# Patient Record
Sex: MALE | Race: BLACK OR AFRICAN AMERICAN | Employment: FULL TIME | URBAN - METROPOLITAN AREA
[De-identification: names, ages, dates, MRNs, and addresses within clinical notes are randomized per-mention and may not be internally consistent; named-entity substitution may affect disease eponyms.]

---

## 2019-03-24 ENCOUNTER — APPOINTMENT (OUTPATIENT)
Dept: VASCULAR SURGERY | Age: 34
End: 2019-03-24
Attending: EMERGENCY MEDICINE
Payer: COMMERCIAL

## 2019-03-24 ENCOUNTER — HOSPITAL ENCOUNTER (EMERGENCY)
Age: 34
Discharge: HOME | End: 2019-03-24
Attending: EMERGENCY MEDICINE
Payer: COMMERCIAL

## 2019-03-24 VITALS
TEMPERATURE: 97.9 F | RESPIRATION RATE: 16 BRPM | HEART RATE: 86 BPM | HEIGHT: 72 IN | SYSTOLIC BLOOD PRESSURE: 131 MMHG | BODY MASS INDEX: 42.66 KG/M2 | OXYGEN SATURATION: 100 % | WEIGHT: 315 LBS | DIASTOLIC BLOOD PRESSURE: 79 MMHG

## 2019-03-24 DIAGNOSIS — L03.116 CELLULITIS OF LEFT LOWER EXTREMITY: Primary | ICD-10-CM

## 2019-03-24 PROCEDURE — 99282 EMERGENCY DEPT VISIT SF MDM: CPT

## 2019-03-24 PROCEDURE — 93971 EXTREMITY STUDY: CPT

## 2019-03-24 RX ORDER — CEPHALEXIN 500 MG/1
500 CAPSULE ORAL 3 TIMES DAILY
Qty: 30 CAP | Refills: 0 | Status: SHIPPED | OUTPATIENT
Start: 2019-03-24 | End: 2019-04-03

## 2019-03-24 NOTE — ED NOTES
Pt refused boostrix injection. RN explained risk and benefits including death from tetany. Pt verbalizes understanding. MD notified

## 2019-03-24 NOTE — ED TRIAGE NOTES
Triage:  Pt arrives from home with a CC of having left shin pain that started when he hit his leg on a toe hitch. Pt patient arrives with a bruise to left leg with surrounding erythema. The pain is aggravated by ambulation.

## 2019-03-24 NOTE — ED PROVIDER NOTES
26 y.o. male with no significant past medical history who presents from home via personal vehicle with chief complaint of leg pain. Pt states that 3 weeks ago, he hit his left leg against a trailer hitch. Pt notes that about 2 weeks ago his leg became red around the abrasion with some notable swelling. Pt states the swelling won't go away and it is becoming painful. Pt states he believes his last tetanus shot was in 2006. Pt states he is in good health. Pt denies being thirsty or frequent urination. There are no other acute medical concerns at this time. Social hx: none. Note written by chuy Li, as dictated by Isabella Gomes MD 7:31 AM 
 
 
The history is provided by the patient. No  was used. No past medical history on file. No past surgical history on file. No family history on file. Social History Socioeconomic History  Marital status: Not on file Spouse name: Not on file  Number of children: Not on file  Years of education: Not on file  Highest education level: Not on file Occupational History  Not on file Social Needs  Financial resource strain: Not on file  Food insecurity:  
  Worry: Not on file Inability: Not on file  Transportation needs:  
  Medical: Not on file Non-medical: Not on file Tobacco Use  Smoking status: Not on file Substance and Sexual Activity  Alcohol use: Not on file  Drug use: Not on file  Sexual activity: Not on file Lifestyle  Physical activity:  
  Days per week: Not on file Minutes per session: Not on file  Stress: Not on file Relationships  Social connections:  
  Talks on phone: Not on file Gets together: Not on file Attends Taoist service: Not on file Active member of club or organization: Not on file Attends meetings of clubs or organizations: Not on file Relationship status: Not on file  Intimate partner violence: Fear of current or ex partner: Not on file Emotionally abused: Not on file Physically abused: Not on file Forced sexual activity: Not on file Other Topics Concern  Not on file Social History Narrative  Not on file ALLERGIES: Patient has no known allergies. Review of Systems Constitutional: Negative for appetite change, chills and fever. HENT: Negative for rhinorrhea, sore throat and trouble swallowing. Excessive Thirst.  
Eyes: Negative for photophobia. Respiratory: Negative for cough and shortness of breath. Cardiovascular: Positive for leg swelling (left leg). Negative for chest pain and palpitations. Gastrointestinal: Negative for abdominal pain, nausea and vomiting. Genitourinary: Negative for dysuria, frequency and hematuria. Musculoskeletal: Negative for arthralgias. Skin: Positive for wound (mid anterior left leg). Neurological: Negative for dizziness, syncope and weakness. Psychiatric/Behavioral: Negative for behavioral problems. The patient is not nervous/anxious. Vitals:  
 03/24/19 2791 BP: 131/79 Pulse: 86 Resp: 16 Temp: 97.9 °F (36.6 °C) SpO2: 100% Physical Exam  
Constitutional: He appears well-developed and well-nourished. HENT:  
Head: Normocephalic and atraumatic. Mouth/Throat: Oropharynx is clear and moist.  
Eyes: Pupils are equal, round, and reactive to light. EOM are normal.  
Neck: Normal range of motion. Neck supple. Cardiovascular: Normal rate, regular rhythm, normal heart sounds and intact distal pulses. Exam reveals no gallop and no friction rub. No murmur heard. Pulmonary/Chest: Effort normal. No respiratory distress. He has no wheezes. He has no rales. Abdominal: Soft. There is no tenderness. There is no rebound. Musculoskeletal: Normal range of motion. Left leg larger than right leg. Neurological: He is alert. No cranial nerve deficit. Motor; symmetric Skin:  
 Abrasion mid anterior of left leg. Surrounding erythema and redness. Psychiatric: He has a normal mood and affect. His behavior is normal.  
Nursing note and vitals reviewed. Note written by chuy Jonas, as dictated by Jefferson Garibay MD 7:31 AM 
 
MDM Procedures PROGRESS NOTE: 
7:50 AM 
Pt refused his Tetanus shot. PROGRESS NOTE: 
8:19 AM 
Pt refused ultrasound and tetanus. Will speak with the patient and reiterate the importance. PROGRESS NOTE: 
8:21 AM 
Spoke with the patient about tetanus stating that it can cause death or severe illness with prolonged intubation and ventillation. Discussed DVT's not getting better without proper treatment and leading to potentially fatal PE. Pt still denies the tetanus shot but will get his vascular doppler.

## 2019-03-24 NOTE — DISCHARGE INSTRUCTIONS
Patient [address] [facility] Departments     For adult and child immunizations, family planning, TB screening, STD testing and women's health services. [facility]: [place] [phone]      [facility] 25   [address]   [address]   [address] [facility] [address]: Pauletta Peabody [address] [phone]      [address]          [address] [place] [address]     For primary care services, woman and child wellness, and some clinics providing specialty care. [facility] -- [address] 2525 [facility] [phone]   [address] [facility] 200 [facility] 3614 [facility] [phone]   [address] [facility] [address]  [phone]   [address] [facility] [address] [place] [address] 5850  Community  [phone]   [address] [place] [address] [address] [phone]   [facility] 81 [facility] [phone]   [facility] [address] [place] [address] [phone]   [facility]: [facility] [address] [place] [address], [address] [phone]     [facility] [address] [facility] [address] [phone]   [facility] 5850 [facility]  [phone]   [facility]  [address], [address] ([email or web address]) [phone]         Sexual Health/Woman Wellness Clinics    For STD/HIV testing and treatment, pregnancy testing and services, men's health, birth control services, LGBT services, and hepatitis/HPV vaccine services. [facility]jay [facility] Kathie for [place] [employer] 201 [address] [facility] 75 [facility] [address] [facility] 1578 600 ADINA Wolfe [phone]   [facility] [address], 5th floor [phone]   Pregnancy [address]201 [facility] for Women [facility] FRANK  Chitra Arriaga [phone] [address] [facility] [address] Pritesh Bedoya [facility] [phone]   6631 [facility]   [phone]   [place]   [phone]   Women, Infant and Children's Services: [address] [phone]       600 [facility]   [phone]   [address]   [facility]     [phone]   [address] Crisis   [address] [facility] [address] [phone]     Local Primary Care Physicians  [facility] [phone]  MD Lorin Hwang MD May Clam, MD [facility] [phone]  Maame Steele, FNP  MD Shikha Talavera MD Ardyce Hunting, MD [address] [place] [address] [phone]  MD Joshua Fatima [place] [address] [facility] [phone]  MD Nusrat Quinonez, MD Janyce Starch, MD Earmon Ahumada, MD   [facility] [phone]  [facility] JAMES ROMERO, MD Freedom Salinas MD  [facility], NP [address] [facility] [address] [facility] [address] [phone]  Noralee Breed, MD Emmit Eva, MD Kennard Pickett, MD Dyana Heimlich, MD Garry Coombs, MD Arnav Green, MD   33 57 [facility]  Eda Flores MD [address] [phone]  MD Jessenia Aguilar, NP  MD Darby Lazcano, MD Myrna Fine, MD Daniel Mckeon MD   3961 [facility] [phone]  MD Nicolasa Solitario, FNP  Darry Goodell, NP  MD Isabel Moraes, MD Sagar Dang, MD REINIER Brown [facility] [phone]  Rojean Kid, MD  Luis MD Adi Shrestha MD Ricardo Ree, MD Bernardo Grew, MD   [address] [phone]  MD Hortencia Hanks MD [facility] [phone]  MD [facility] MD Chi Remy MD   1900 [facility] [phone]  Stephanie Lennon, MD Collin Becker MD Manley Kiang, MD Malen Foster, MD Majorie Miner, ABISAI Garcia MD 1619 [facility]   [phone]  MD Bryson Walden MD Ballard Siren, MD   2102 [facility] [phone]  MD Danitza De Leon, BELLA Oconnor, DAWN Oconnor, DAWN Stovall MD Lesli Postal, ABISAI Last, DO Miscellaneous:  Frannie Lazcano [phone]            Cellulitis: Care Instructions  Your Care Instructions    Cellulitis is a skin infection caused by bacteria, most often strep or staph. It often occurs after a break in the skin from a scrape, cut, bite, or puncture, or after a rash. Cellulitis may be treated without doing tests to find out what caused it. But your doctor may do tests, if needed, to look for a specific bacteria, like methicillin-resistant Staphylococcus aureus (MRSA). The doctor has checked you carefully, but problems can develop later. If you notice any problems or new symptoms, get medical treatment right away. Follow-up care is a key part of your treatment and safety. Be sure to make and go to all appointments, and call your doctor if you are having problems. It's also a good idea to know your test results and keep a list of the medicines you take. How can you care for yourself at home? · Take your antibiotics as directed. Do not stop taking them just because you feel better. You need to take the full course of antibiotics. · Prop up the infected area on pillows to reduce pain and swelling. Try to keep the area above the level of your heart as often as you can. · If your doctor told you how to care for your wound, follow your doctor's instructions.  If you did not get instructions, follow this general advice:  ? Wash the wound with clean water 2 times a day. Don't use hydrogen peroxide or alcohol, which can slow healing. ? You may cover the wound with a thin layer of petroleum jelly, such as Vaseline, and a nonstick bandage. ? Apply more petroleum jelly and replace the bandage as needed. · Be safe with medicines. Take pain medicines exactly as directed. ? If the doctor gave you a prescription medicine for pain, take it as prescribed. ? If you are not taking a prescription pain medicine, ask your doctor if you can take an over-the-counter medicine. To prevent cellulitis in the future  · Try to prevent cuts, scrapes, or other injuries to your skin. Cellulitis most often occurs where there is a break in the skin. · If you get a scrape, cut, mild burn, or bite, wash the wound with clean water as soon as you can to help avoid infection. Don't use hydrogen peroxide or alcohol, which can slow healing. · If you have swelling in your legs (edema), support stockings and good skin care may help prevent leg sores and cellulitis. · Take care of your feet, especially if you have diabetes or other conditions that increase the risk of infection. Wear shoes and socks. Do not go barefoot. If you have athlete's foot or other skin problems on your feet, talk to your doctor about how to treat them. When should you call for help? Call your doctor now or seek immediate medical care if:    · You have signs that your infection is getting worse, such as:  ? Increased pain, swelling, warmth, or redness. ? Red streaks leading from the area. ? Pus draining from the area. ? A fever.     · You get a rash.    Watch closely for changes in your health, and be sure to contact your doctor if:    · You do not get better as expected. Where can you learn more? Go to http://neela-margaux.info/. Crispin Mcarthur in the search box to learn more about \"Cellulitis: Care Instructions. \"  Current as of: April 17, 2018  Content Version: 11.9  © 0970-8960 [employer]. Care instructions adapted under license by [employer] (which disclaims liability or warranty for this information). If you have questions about a medical condition or this instruction, always ask your healthcare professional. [address] any warranty or liability for your use of this information.

## 2019-04-16 ENCOUNTER — OFFICE VISIT (OUTPATIENT)
Dept: FAMILY MEDICINE CLINIC | Age: 34
End: 2019-04-16

## 2019-04-16 VITALS
SYSTOLIC BLOOD PRESSURE: 112 MMHG | WEIGHT: 315 LBS | BODY MASS INDEX: 42.66 KG/M2 | HEART RATE: 90 BPM | HEIGHT: 72 IN | DIASTOLIC BLOOD PRESSURE: 72 MMHG | RESPIRATION RATE: 20 BRPM | OXYGEN SATURATION: 97 % | TEMPERATURE: 97.6 F

## 2019-04-16 DIAGNOSIS — E55.9 VITAMIN D DEFICIENCY: ICD-10-CM

## 2019-04-16 DIAGNOSIS — Z13.220 LIPID SCREENING: ICD-10-CM

## 2019-04-16 DIAGNOSIS — L03.116 LEFT LEG CELLULITIS: ICD-10-CM

## 2019-04-16 DIAGNOSIS — R53.83 FATIGUE, UNSPECIFIED TYPE: ICD-10-CM

## 2019-04-16 DIAGNOSIS — E66.01 OBESITY, MORBID (HCC): ICD-10-CM

## 2019-04-16 DIAGNOSIS — Z76.89 ENCOUNTER TO ESTABLISH CARE: Primary | ICD-10-CM

## 2019-04-16 DIAGNOSIS — M79.89 LEFT LEG SWELLING: ICD-10-CM

## 2019-04-16 DIAGNOSIS — Z13.1 DIABETES MELLITUS SCREENING: ICD-10-CM

## 2019-04-16 RX ORDER — HYDROCHLOROTHIAZIDE 12.5 MG/1
12.5 TABLET ORAL
Qty: 60 TAB | Refills: 1 | Status: SHIPPED | OUTPATIENT
Start: 2019-04-16 | End: 2019-04-24 | Stop reason: SDUPTHER

## 2019-04-16 RX ORDER — SULFAMETHOXAZOLE AND TRIMETHOPRIM 800; 160 MG/1; MG/1
1 TABLET ORAL 2 TIMES DAILY
Qty: 20 TAB | Refills: 0 | Status: SHIPPED | OUTPATIENT
Start: 2019-04-16 | End: 2019-04-24 | Stop reason: SDUPTHER

## 2019-04-16 NOTE — PROGRESS NOTES
Bev Davenport  Identified pt with two pt identifiers(name and ). Chief Complaint Patient presents with  Leg Swelling Rm 7  
 
 
1. Have you been to the ER, urgent care clinic since your last visit? [address]  Hospitalized since your last visit? N 
 
2. Have you seen or consulted any other health care providers outside of the [address] [place] [address] since your last visit? Include any pap smears or colon screening. Tahir Anthony Advance Care Planning In the event something were to happen to you and you were unable to speak on your behalf, do you have an Advance Directive/ Living Will in place stating your wishes? NO If yes, do we have a copy on file NO If no, would you like information NO Medication reconciliation up to date and corrected with patient at this time. Today's provider has been notified of reason for visit, vitals and flowsheets obtained on patients. Reviewed record in preparation for visit, huddled with provider and have obtained necessary documentation. Health Maintenance Due Topic  DTaP/Tdap/Td series (1 - Tdap) Wt Readings from Last 3 Encounters:  
19 319 lb 14.4 oz (145.1 kg) Temp Readings from Last 3 Encounters:  
19 97.6 °F (36.4 °C) (Oral) BP Readings from Last 3 Encounters:  
19 112/72 Pulse Readings from Last 3 Encounters:  
19 90 Vitals:  
 19 1015 BP: 112/72 Pulse: 90 Resp: 20 Temp: 97.6 °F (36.4 °C) TempSrc: Oral  
SpO2: 97% Weight: 319 lb 14.4 oz (145.1 kg) Height: 6' (1.829 m) PainSc:   0 - No pain Learning Assessment: 
:  
 
Learning Assessment 2019 PRIMARY LEARNER Patient HIGHEST LEVEL OF EDUCATION - PRIMARY LEARNER  SOME COLLEGE  
BARRIERS PRIMARY LEARNER NONE  
CO-LEARNER CAREGIVER No  
PRIMARY LANGUAGE ENGLISH  
LEARNER PREFERENCE PRIMARY DEMONSTRATION  
ANSWERED BY patient RELATIONSHIP SELF Depression Screening: 
:  
 
 3 most recent PHQ Screens 4/16/2019 Little interest or pleasure in doing things Not at all Feeling down, depressed, irritable, or hopeless Not at all Total Score PHQ 2 0 No flowsheet data found. Fall Risk Assessment: 
:  
 
No flowsheet data found. Abuse Screening: 
:  
 
No flowsheet data found. ADL Screening: 
:  
 
ADL Assessment 4/16/2019 Feeding yourself No Help Needed Getting from bed to chair No Help Needed Getting dressed No Help Needed Bathing or showering No Help Needed Walk across the room (includes cane/walker) No Help Needed Using the telphone No Help Needed Taking your medications No Help Needed Preparing meals No Help Needed Managing money (expenses/bills) No Help Needed Moderately strenuous housework (laundry) No Help Needed Shopping for personal items (toiletries/medicines) No Help Needed Shopping for groceries No Help Needed Driving No Help Needed Climbing a flight of stairs No Help Needed Getting to places beyond walking distances No Help Needed BMI: 
Weight Metrics 4/16/2019 Weight 319 lb 14.4 oz BMI 43.39 kg/m2 Medication reconciliation up to date and corrected with patient at this time.

## 2019-04-16 NOTE — PATIENT INSTRUCTIONS
Cellulitis: Care Instructions Your Care Instructions Cellulitis is a skin infection caused by bacteria, most often strep or staph. It often occurs after a break in the skin from a scrape, cut, bite, or puncture, or after a rash. Cellulitis may be treated without doing tests to find out what caused it. But your doctor may do tests, if needed, to look for a specific bacteria, like methicillin-resistant Staphylococcus aureus (MRSA). The doctor has checked you carefully, but problems can develop later. If you notice any problems or new symptoms, get medical treatment right away. Follow-up care is a key part of your treatment and safety. Be sure to make and go to all appointments, and call your doctor if you are having problems. It's also a good idea to know your test results and keep a list of the medicines you take. How can you care for yourself at home? · Take your antibiotics as directed. Do not stop taking them just because you feel better. You need to take the full course of antibiotics. · Prop up the infected area on pillows to reduce pain and swelling. Try to keep the area above the level of your heart as often as you can. · If your doctor told you how to care for your wound, follow your doctor's instructions. If you did not get instructions, follow this general advice: 
? Wash the wound with clean water 2 times a day. Don't use hydrogen peroxide or alcohol, which can slow healing. ? You may cover the wound with a thin layer of petroleum jelly, such as Vaseline, and a nonstick bandage. ? Apply more petroleum jelly and replace the bandage as needed. · Be safe with medicines. Take pain medicines exactly as directed. ? If the doctor gave you a prescription medicine for pain, take it as prescribed. ? If you are not taking a prescription pain medicine, ask your doctor if you can take an over-the-counter medicine. To prevent cellulitis in the future · Try to prevent cuts, scrapes, or other injuries to your skin. Cellulitis most often occurs where there is a break in the skin. · If you get a scrape, cut, mild burn, or bite, wash the wound with clean water as soon as you can to help avoid infection. Don't use hydrogen peroxide or alcohol, which can slow healing. · If you have swelling in your legs (edema), support stockings and good skin care may help prevent leg sores and cellulitis. · Take care of your feet, especially if you have diabetes or other conditions that increase the risk of infection. Wear shoes and socks. Do not go barefoot. If you have athlete's foot or other skin problems on your feet, talk to your doctor about how to treat them. When should you call for help? Call your doctor now or seek immediate medical care if: 
  · You have signs that your infection is getting worse, such as: 
? Increased pain, swelling, warmth, or redness. ? Red streaks leading from the area. ? Pus draining from the area. ? A fever.  
  · You get a rash.  
 Watch closely for changes in your health, and be sure to contact your doctor if: 
  · You do not get better as expected. Where can you learn more? Go to http://neela-margaux.info/. Faye Rai in the search box to learn more about \"Cellulitis: Care Instructions. \" Current as of: April 17, 2018 Content Version: 11.9 © 1892-4486 [employer]. Care instructions adapted under license by [employer] (which disclaims liability or warranty for this information). If you have questions about a medical condition or this instruction, always ask your healthcare professional. [address] any warranty or liability for your use of this information. Leg and Ankle Edema: Care Instructions Your Care Instructions Swelling in the legs, ankles, and feet is called edema.  It is common after you sit or stand for a while. Long plane flights or car rides often cause swelling in the legs and feet. You may also have swelling if you have to stand for long periods of time at your job. Problems with the veins in the legs (varicose veins) and changes in hormones can also cause swelling. Sometimes the swelling in the ankles and feet is caused by a more serious problem, such as heart failure, infection, blood clots, or liver or kidney disease. Follow-up care is a key part of your treatment and safety. Be sure to make and go to all appointments, and call your doctor if you are having problems. It's also a good idea to know your test results and keep a list of the medicines you take. How can you care for yourself at home? · If your doctor gave you medicine, take it as prescribed. Call your doctor if you think you are having a problem with your medicine. · Whenever you are resting, raise your legs up. Try to keep the swollen area higher than the level of your heart. · Take breaks from standing or sitting in one position. ? Walk around to increase the blood flow in your lower legs. ? Move your feet and ankles often while you stand, or tighten and relax your leg muscles. · Wear support stockings. Put them on in the morning, before swelling gets worse. · Eat a balanced diet. Lose weight if you need to. · Limit the amount of salt (sodium) in your diet. Salt holds fluid in the body and may increase swelling. When should you call for help? Call 911 anytime you think you may need emergency care. For example, call if: 
  · You have symptoms of a blood clot in your lung (called a pulmonary embolism). These may include: 
? Sudden chest pain. ? Trouble breathing. ? Coughing up blood.  
 Call your doctor now or seek immediate medical care if: 
  · You have signs of a blood clot, such as: 
? Pain in your calf, back of the knee, thigh, or groin. ? Redness and swelling in your leg or groin.   · You have symptoms of infection, such as: 
? Increased pain, swelling, warmth, or redness. ? Red streaks or pus. ? A fever.  
 Watch closely for changes in your health, and be sure to contact your doctor if: 
  · Your swelling is getting worse.  
  · You have new or worsening pain in your legs.  
  · You do not get better as expected. Where can you learn more? Go to http://neela-margaux.info/. Enter S628 in the search box to learn more about \"Leg and Ankle Edema: Care Instructions. \" Current as of: September 23, 2018 Content Version: 11.9 © 4571-5619 [employer]. Care instructions adapted under license by [employer] (which disclaims liability or warranty for this information). If you have questions about a medical condition or this instruction, always ask your healthcare professional. [address] any warranty or liability for your use of this information.

## 2019-04-16 NOTE — PROGRESS NOTES
Chief Complaint Patient presents with  Leg Swelling Rm 7  
 
 
HPI: 
Brayden Ding is a 26y.o. year old male who is a new patient and is here to establish care. He was previously followed by unknown, last doc visit was at 11years old. He is moving to [place], [place] in 2 weeks. His wife is a  and they are moving for her new job. Leg swelling x 1 1/2 months ago. He hit a tow hitch recently on 3/3/2019. He went to the ED. He was prescribed antibiotics for cellulitis. He had a venous doppler of the left leg that was negative per the patient. He refused Tdap. He worked last night 8 hours and was able to sit. Today his leg is at what it is at it's best therefore. He is a  and also a night at [employer]. He reports that at it it's worse it is approximately twice the size it is today. Left leg The following sections were reviewed & updated as appropriate: PMH, PSH, PL, FMH,  and SH. History reviewed. No pertinent past medical history. History reviewed. No pertinent surgical history. Patient Active Problem List  
Diagnosis Code  Obesity, morbid ([facility] [address] E66.01  Left leg swelling M79.89 Family History Problem Relation Age of Onset  Cancer Mother  Dementia Father  Alcohol abuse Brother  Cancer Maternal Aunt  Cancer Paternal Uncle  Alzheimer Paternal Grandmother Social History Socioeconomic History  Marital status:  Spouse name: Not on file  Number of children: Not on file  Years of education: Not on file  Highest education level: Not on file Occupational History  Not on file Social Needs  Financial resource strain: Not on file  Food insecurity:  
  Worry: Not on file Inability: Not on file  Transportation needs:  
  Medical: Not on file Non-medical: Not on file Tobacco Use  Smoking status: Never Smoker  Smokeless tobacco: Never Used Substance and Sexual Activity  Alcohol use: Never Frequency: Never  Drug use: Never  Sexual activity: Yes  
  Partners: Female Lifestyle  Physical activity:  
  Days per week: Not on file Minutes per session: Not on file  Stress: Not on file Relationships  Social connections:  
  Talks on phone: Not on file Gets together: Not on file Attends Church service: Not on file Active member of club or organization: Not on file Attends meetings of clubs or organizations: Not on file Relationship status: Not on file  Intimate partner violence:  
  Fear of current or ex partner: Not on file Emotionally abused: Not on file Physically abused: Not on file Forced sexual activity: Not on file Other Topics Concern  Not on file Social History Narrative  Not on file Prior to Admission medications Medication Sig Start Date End Date Taking? Authorizing Provider  
hydroCHLOROthiazide (HYDRODIURIL) 12.5 mg tablet Take 1 Tab by mouth two (2) times daily as needed for Other (edema). 4/16/19  Yes Vicki Denny NP  
trimethoprim-sulfamethoxazole (BACTRIM DS, SEPTRA DS) 160-800 mg per tablet Take 1 Tab by mouth two (2) times a day for 10 days. 4/16/19 4/26/19 Yes Vicki Denny NP No Known Allergies Review of Systems A comprehensive review of systems was negative except for that written in the HPI. Objective: 
 
  
Visit Vitals /72 (BP 1 Location: Right arm, BP Patient Position: Sitting) Pulse 90 Temp 97.6 °F (36.4 °C) (Oral) Resp 20 Ht 6' (1.829 m) Wt 319 lb 14.4 oz (145.1 kg) SpO2 97% BMI 43.39 kg/m² Physical Exam 
Gen: alert, oriented, no acute distress Resp: no increase work of breathing, lungs clear to ausculation bilaterally, no wheezing, rales or rhonchi CV: S1, S2 normal.  No murmurs, rubs, or gallops. Skin: as above Extremities: as above Assessment & Plan: ICD-10-CM ICD-9-CM 1. Encounter to establish care Z76.89 V65.8 URINALYSIS W/ RFLX MICROSCOPIC  
   LIPID PANEL  
   METABOLIC PANEL, COMPREHENSIVE  
   TSH 3RD GENERATION  
   VITAMIN D, 25 HYDROXY  
   HEMOGLOBIN A1C WITH EAG  
   CBC WITH AUTOMATED DIFF 2. Left leg swelling M79.89 729.81 hydroCHLOROthiazide (HYDRODIURIL) 12.5 mg tablet 3. Left leg cellulitis L03.116 682.6 trimethoprim-sulfamethoxazole (BACTRIM DS, SEPTRA DS) 160-800 mg per tablet 4. Obesity, morbid ([address] E66.01 278.01 URINALYSIS W/ RFLX MICROSCOPIC  
   LIPID PANEL  
   METABOLIC PANEL, COMPREHENSIVE  
   TSH 3RD GENERATION  
   VITAMIN D, 25 HYDROXY  
   HEMOGLOBIN A1C WITH EAG  
   CBC WITH AUTOMATED DIFF 5. Diabetes mellitus screening Z13.1 V77.1 URINALYSIS W/ RFLX MICROSCOPIC METABOLIC PANEL, COMPREHENSIVE  
   HEMOGLOBIN A1C WITH EAG 6. Lipid screening Z13.220 V77.91 LIPID PANEL 7. Vitamin D deficiency E55.9 268.9 VITAMIN D, 25 HYDROXY 8. Fatigue, unspecified type R53.83 780.79 URINALYSIS W/ RFLX MICROSCOPIC  
   LIPID PANEL  
   METABOLIC PANEL, COMPREHENSIVE  
   TSH 3RD GENERATION  
   VITAMIN D, 25 HYDROXY  
   HEMOGLOBIN A1C WITH EAG  
   CBC WITH AUTOMATED DIFF Follow-up and Dispositions · Return in about 1 week (around 4/23/2019) for Medication Check, Lab F/U, MICHAELLE recheck. lab results and schedule of future lab studies reviewed with patient - fasting labs ordered, he will have labs done tomorrow morning 
reviewed diet, exercise and weight control 
reviewed medications and side effects in detail Differential diagnosis and treatment options reviewed with patient who is in agreement with treatment plan as outlined below. Health Maintenance reviewed - deferred to next visit. Recommended healthy diet low in carbohydrates, fats, sodium and cholesterol. Recommended regular cardiovascular exercise 3-6 times per week for 30-60 minutes daily. Chart is reviewed and updated today in the office.   Records requested for other providers patient has seen and is currently seeing.  (Prescription Monitoring Program) report was run and reviewed today in the office. Patient was offered a choice/choices in the treatment plan today. Patient expresses understanding of the plan and agrees with recommendations. Verbal and written instructions (see AVS) provided. See patient instructions for more. Patient expresses understanding of diagnosis and treatment plan.

## 2019-04-17 DIAGNOSIS — Z76.89 ENCOUNTER TO ESTABLISH CARE: ICD-10-CM

## 2019-04-17 DIAGNOSIS — Z13.1 DIABETES MELLITUS SCREENING: ICD-10-CM

## 2019-04-17 DIAGNOSIS — E66.01 OBESITY, MORBID (HCC): ICD-10-CM

## 2019-04-17 DIAGNOSIS — R53.83 FATIGUE, UNSPECIFIED TYPE: ICD-10-CM

## 2019-04-17 DIAGNOSIS — E55.9 VITAMIN D DEFICIENCY: ICD-10-CM

## 2019-04-17 DIAGNOSIS — Z13.220 LIPID SCREENING: ICD-10-CM

## 2019-04-18 LAB
25(OH)D3+25(OH)D2 SERPL-MCNC: 12.8 NG/ML (ref 30–100)
ALBUMIN SERPL-MCNC: 4.3 G/DL (ref 3.5–5.5)
ALBUMIN/GLOB SERPL: 1.3 {RATIO} (ref 1.2–2.2)
ALP SERPL-CCNC: 95 IU/L (ref 39–117)
ALT SERPL-CCNC: 13 IU/L (ref 0–44)
APPEARANCE UR: CLEAR
AST SERPL-CCNC: 13 IU/L (ref 0–40)
BACTERIA #/AREA URNS HPF: NORMAL /[HPF]
BASOPHILS # BLD AUTO: 0 X10E3/UL (ref 0–0.2)
BASOPHILS NFR BLD AUTO: 0 %
BILIRUB SERPL-MCNC: 0.8 MG/DL (ref 0–1.2)
BILIRUB UR QL STRIP: NEGATIVE
BUN SERPL-MCNC: 11 MG/DL (ref 6–20)
BUN/CREAT SERPL: 9 (ref 9–20)
CALCIUM SERPL-MCNC: 9.4 MG/DL (ref 8.7–10.2)
CASTS URNS QL MICRO: NORMAL /LPF
CHLORIDE SERPL-SCNC: 99 MMOL/L (ref 96–106)
CHOLEST SERPL-MCNC: 158 MG/DL (ref 100–199)
CO2 SERPL-SCNC: 26 MMOL/L (ref 20–29)
COLOR UR: YELLOW
CREAT SERPL-MCNC: 1.28 MG/DL (ref 0.76–1.27)
EOSINOPHIL # BLD AUTO: 0.1 X10E3/UL (ref 0–0.4)
EOSINOPHIL NFR BLD AUTO: 2 %
EPI CELLS #/AREA URNS HPF: NORMAL /HPF (ref 0–10)
ERYTHROCYTE [DISTWIDTH] IN BLOOD BY AUTOMATED COUNT: 13 % (ref 12.3–15.4)
EST. AVERAGE GLUCOSE BLD GHB EST-MCNC: 103 MG/DL
GLOBULIN SER CALC-MCNC: 3.4 G/DL (ref 1.5–4.5)
GLUCOSE SERPL-MCNC: 94 MG/DL (ref 65–99)
GLUCOSE UR QL: NEGATIVE
HBA1C MFR BLD: 5.2 % (ref 4.8–5.6)
HCT VFR BLD AUTO: 38.4 % (ref 37.5–51)
HDLC SERPL-MCNC: 40 MG/DL
HGB BLD-MCNC: 12.2 G/DL (ref 13–17.7)
HGB UR QL STRIP: NEGATIVE
IMM GRANULOCYTES # BLD AUTO: 0 X10E3/UL (ref 0–0.1)
IMM GRANULOCYTES NFR BLD AUTO: 0 %
INTERPRETATION, 910389: NORMAL
KETONES UR QL STRIP: NEGATIVE
LDLC SERPL CALC-MCNC: 102 MG/DL (ref 0–99)
LEUKOCYTE ESTERASE UR QL STRIP: ABNORMAL
LYMPHOCYTES # BLD AUTO: 2.8 X10E3/UL (ref 0.7–3.1)
LYMPHOCYTES NFR BLD AUTO: 30 %
MCH RBC QN AUTO: 29 PG (ref 26.6–33)
MCHC RBC AUTO-ENTMCNC: 31.8 G/DL (ref 31.5–35.7)
MCV RBC AUTO: 91 FL (ref 79–97)
MICRO URNS: ABNORMAL
MONOCYTES # BLD AUTO: 0.8 X10E3/UL (ref 0.1–0.9)
MONOCYTES NFR BLD AUTO: 9 %
MUCOUS THREADS URNS QL MICRO: PRESENT
NEUTROPHILS # BLD AUTO: 5.6 X10E3/UL (ref 1.4–7)
NEUTROPHILS NFR BLD AUTO: 59 %
NITRITE UR QL STRIP: NEGATIVE
PH UR STRIP: 6 [PH] (ref 5–7.5)
PLATELET # BLD AUTO: 263 X10E3/UL (ref 150–379)
POTASSIUM SERPL-SCNC: 4.3 MMOL/L (ref 3.5–5.2)
PROT SERPL-MCNC: 7.7 G/DL (ref 6–8.5)
PROT UR QL STRIP: NEGATIVE
RBC # BLD AUTO: 4.2 X10E6/UL (ref 4.14–5.8)
RBC #/AREA URNS HPF: NORMAL /HPF (ref 0–2)
SODIUM SERPL-SCNC: 142 MMOL/L (ref 134–144)
SP GR UR: 1.02 (ref 1–1.03)
TRIGL SERPL-MCNC: 80 MG/DL (ref 0–149)
TSH SERPL DL<=0.005 MIU/L-ACNC: 2.68 UIU/ML (ref 0.45–4.5)
UROBILINOGEN UR STRIP-MCNC: 1 MG/DL (ref 0.2–1)
VLDLC SERPL CALC-MCNC: 16 MG/DL (ref 5–40)
WBC # BLD AUTO: 9.4 X10E3/UL (ref 3.4–10.8)
WBC #/AREA URNS HPF: NORMAL /HPF (ref 0–5)

## 2019-04-24 ENCOUNTER — OFFICE VISIT (OUTPATIENT)
Dept: FAMILY MEDICINE CLINIC | Age: 34
End: 2019-04-24

## 2019-04-24 VITALS
RESPIRATION RATE: 20 BRPM | WEIGHT: 309.5 LBS | HEIGHT: 72 IN | BODY MASS INDEX: 41.92 KG/M2 | TEMPERATURE: 98.3 F | SYSTOLIC BLOOD PRESSURE: 106 MMHG | DIASTOLIC BLOOD PRESSURE: 70 MMHG | HEART RATE: 96 BPM | OXYGEN SATURATION: 97 %

## 2019-04-24 DIAGNOSIS — L03.116 CELLULITIS OF LEFT LOWER LEG: ICD-10-CM

## 2019-04-24 DIAGNOSIS — L03.116 LEFT LEG CELLULITIS: ICD-10-CM

## 2019-04-24 DIAGNOSIS — M79.89 LEFT LEG SWELLING: Primary | ICD-10-CM

## 2019-04-24 DIAGNOSIS — E66.01 OBESITY, MORBID (HCC): ICD-10-CM

## 2019-04-24 RX ORDER — SULFAMETHOXAZOLE AND TRIMETHOPRIM 800; 160 MG/1; MG/1
1 TABLET ORAL 2 TIMES DAILY
Qty: 20 TAB | Refills: 0 | Status: SHIPPED | OUTPATIENT
Start: 2019-04-24 | End: 2019-05-04

## 2019-04-24 RX ORDER — HYDROCHLOROTHIAZIDE 12.5 MG/1
12.5 TABLET ORAL
Qty: 60 TAB | Refills: 1 | Status: SHIPPED | OUTPATIENT
Start: 2019-04-24

## 2019-04-24 NOTE — PROGRESS NOTES
Chasity Adamson  Identified pt with two pt identifiers(name and ). Chief Complaint   Patient presents with    Follow-up     1 week  /Room 6       1. Have you been to the ER, urgent care clinic since your last visit?n   Hospitalized since your last visit? n    2. Have you seen or consulted any other health care providers outside of the [address] [place] [address] since your last visit? Include any pap smears or colon screening. n       Advance Care Planning    In the event something were to happen to you and you were unable to speak on your behalf, do you have an Advance Directive/ Living Will in place stating your wishes? NO    If yes, do we have a copy on file NO    If no, would you like information NO    Medication reconciliation up to date and corrected with patient at this time. Today's provider has been notified of reason for visit, vitals and flowsheets obtained on patients. Reviewed record in preparation for visit, huddled with provider and have obtained necessary documentation.       Health Maintenance Due   Topic    DTaP/Tdap/Td series (1 - Tdap)       Wt Readings from Last 3 Encounters:   19 309 lb 8 oz (140.4 kg)   19 319 lb 14.4 oz (145.1 kg)   19 320 lb (145.2 kg)     Temp Readings from Last 3 Encounters:   19 98.3 °F (36.8 °C) (Oral)   19 97.6 °F (36.4 °C) (Oral)   19 97.9 °F (36.6 °C)     BP Readings from Last 3 Encounters:   19 106/70   19 112/72   19 131/79     Pulse Readings from Last 3 Encounters:   19 96   19 90   19 86     Vitals:    19 1541   BP: 106/70   Pulse: 96   Resp: 20   Temp: 98.3 °F (36.8 °C)   TempSrc: Oral   SpO2: 97%   Weight: 309 lb 8 oz (140.4 kg)   Height: 6' (1.829 m)   PainSc:   0 - No pain         Learning Assessment:  :     Learning Assessment 2019   PRIMARY LEARNER Patient   HIGHEST LEVEL OF EDUCATION - PRIMARY LEARNER  SOME COLLEGE   [facility] PRIMARY LEARNER NONE   CO-LEARNER CAREGIVER No   PRIMARY LANGUAGE ENGLISH   LEARNER PREFERENCE PRIMARY DEMONSTRATION   ANSWERED BY patient   RELATIONSHIP SELF       Depression Screening:  :     3 most recent PHQ Screens 4/16/2019   Little interest or pleasure in doing things Not at all   Feeling down, depressed, irritable, or hopeless Not at all   Total Score PHQ 2 0       No flowsheet data found. Fall Risk Assessment:  :     No flowsheet data found. Abuse Screening:  :     No flowsheet data found. ADL Screening:  :     ADL Assessment 4/24/2019   Feeding yourself No Help Needed   Getting from bed to chair No Help Needed   Getting dressed No Help Needed   Bathing or showering No Help Needed   Walk across the room (includes cane/walker) No Help Needed   Using the telphone No Help Needed   Taking your medications No Help Needed   Preparing meals No Help Needed   Managing money (expenses/bills) No Help Needed   Moderately strenuous housework (laundry) No Help Needed   Shopping for personal items (toiletries/medicines) No Help Needed   Shopping for groceries No Help Needed   Driving No Help Needed   Climbing a flight of stairs No Help Needed   Getting to places beyond walking distances No Help Needed           BMI:  Weight Metrics 4/24/2019 4/16/2019 3/24/2019   Weight 309 lb 8 oz 319 lb 14.4 oz 320 lb   BMI 41.98 kg/m2 43.39 kg/m2 43.4 kg/m2           Medication reconciliation up to date and corrected with patient at this time.

## 2019-04-24 NOTE — PROGRESS NOTES
Chief Complaint   Patient presents with    Follow-up     1 week  /Room 6         HPI:  The patient is a 26 y.o. male who presents today for a follow up appointment. No hospital, ER or specialist visits since last primary care visit except as noted below. Leg leg cellulitis/edema:  Leg swelling x 1 1/2 months ago. He hit a tow hitch recently on 3/3/2019. He went to the ED. He was prescribed antibiotics for cellulitis. He had a venous doppler of the left leg that was negative per the patient. He refused Tdap. He worked last night 8 hours and was able to sit. Today his leg is at what it is at it's best therefore. He is a  and also a night at [employer]. He reports that at it it's worse it is approximately twice the size it is today. Much better, will be moving this weekend, would like to extend antibiotic through the weekend. Using HCTZ with good results. Left leg                            Labs: Your lab results have been reviewed and are as follows:    Urine Test:  Your urine analysis is normal.    Cholesterol Panel: Excellent! Total Cholesterol is 158 (goal <200). Triglycerides are 80.  (goal <150)  Your HDL or \"good\" cholesterol is 40. (goal >40). Your LDL or \"bad\" cholesterol is 102. (goal <100). CMP:  Fasting blood sugar is normal at 94, your hemoglobin A1C is 5.2. You do not have diabetes. Kidney function is normal.   Your electrolytes are normal.   Your liver function is normal.     Thyroid:  Your thyroid function is normal.    Vitamin D:  Your vitamin D is low at 12.8. This is an important nutrient bone health and to fight inflammation and infection. Start OTC Vitamin D3 2,000 international units daily. A normal value is between 40-50 on average. The symptoms of vitamin D deficiency are sometimes vague and can include tiredness and general aches and pains. Some people may not have any symptoms at all.   Vitamin D also fights infections, including colds and the flu, as it regulates the expression of genes that influence your immune system to attack and destroy bacteria and viruses. CBC:  Your red blood cell count is normal.   Your white blood cell count is normal.   Your platelet count is normal.   You are not anemic and your hemoglobin is 12.2. Orders Only on 04/17/2019   Component Date Value Ref Range Status    Specific Gravity 04/17/2019 1.017  1.005 - 1.030 Final    pH (UA) 04/17/2019 6.0  5.0 - 7.5 Final    Color 04/17/2019 Yellow  Yellow Final    Appearance 04/17/2019 Clear  Clear Final    Leukocyte Esterase 04/17/2019 Trace* Negative Final    Protein 04/17/2019 Negative  Negative/Trace Final    Glucose 04/17/2019 Negative  Negative Final    Ketone 04/17/2019 Negative  Negative Final    Blood 04/17/2019 Negative  Negative Final    Bilirubin 04/17/2019 Negative  Negative Final    Urobilinogen 04/17/2019 1.0  0.2 - 1.0 mg/dL Final    Nitrites 04/17/2019 Negative  Negative Final    Microscopic Examination 04/17/2019 See additional order   Final    Microscopic was indicated and was performed.     Cholesterol, total 04/17/2019 158  100 - 199 mg/dL Final    Triglyceride 04/17/2019 80  0 - 149 mg/dL Final    HDL Cholesterol 04/17/2019 40  >39 mg/dL Final    VLDL, calculated 04/17/2019 16  5 - 40 mg/dL Final    LDL, calculated 04/17/2019 102* 0 - 99 mg/dL Final    Glucose 04/17/2019 94  65 - 99 mg/dL Final    BUN 04/17/2019 11  6 - 20 mg/dL Final    Creatinine 04/17/2019 1.28* 0.76 - 1.27 mg/dL Final    GFR est non-AA 04/17/2019 72  >59 mL/min/1.73 Final    GFR est AA 04/17/2019 83  >59 mL/min/1.73 Final    BUN/Creatinine ratio 04/17/2019 9  9 - 20 Final    Sodium 04/17/2019 142  134 - 144 mmol/L Final    Potassium 04/17/2019 4.3  3.5 - 5.2 mmol/L Final    Chloride 04/17/2019 99  96 - 106 mmol/L Final    CO2 04/17/2019 26  20 - 29 mmol/L Final    Calcium 04/17/2019 9.4  8.7 - 10.2 mg/dL Final    Protein, total 04/17/2019 7.7 6.0 - 8.5 g/dL Final    Albumin 04/17/2019 4.3  3.5 - 5.5 g/dL Final    GLOBULIN, TOTAL 04/17/2019 3.4  1.5 - 4.5 g/dL Final    A-G Ratio 04/17/2019 1.3  1.2 - 2.2 Final    Bilirubin, total 04/17/2019 0.8  0.0 - 1.2 mg/dL Final    Alk. phosphatase 04/17/2019 95  39 - 117 IU/L Final    AST (SGOT) 04/17/2019 13  0 - 40 IU/L Final    ALT (SGPT) 04/17/2019 13  0 - 44 IU/L Final    TSH 04/17/2019 2.680  0.450 - 4.500 uIU/mL Final    VITAMIN D, 25-HYDROXY 04/17/2019 12.8* 30.0 - 100.0 ng/mL Final    Comment: Vitamin D deficiency has been defined by the [facility] practice guideline as a  level of serum 25-OH vitamin D less than 20 ng/mL (1,2). The Endocrine Society went on to further define vitamin D  insufficiency as a level between 21 and 29 ng/mL (2). 1. IOM ([place] of Medicine). 2010. Dietary reference     intakes for calcium and D. [address] [facility]: The     [employer]. 2. Katherin MF, Wagner PARTIDA, Paola RASMUSSEN, et al.     Evaluation, treatment, and prevention of vitamin D     deficiency: an Endocrine Society clinical practice     guideline. JCEM. 2011 Jul; 96(7):1911-30.  Hemoglobin A1c 04/17/2019 5.2  4.8 - 5.6 % Final    Comment:          Prediabetes: 5.7 - 6.4           Diabetes: >6.4           Glycemic control for adults with diabetes: <7.0      Estimated average glucose 04/17/2019 103  mg/dL Final    WBC 04/17/2019 9.4  3.4 - 10.8 x10E3/uL Final    RBC 04/17/2019 4.20  4. 14 - 5.80 x10E6/uL Final    HGB 04/17/2019 12.2* 13.0 - 17.7 g/dL Final    HCT 04/17/2019 38.4  37.5 - 51.0 % Final    MCV 04/17/2019 91  79 - 97 fL Final    MCH 04/17/2019 29.0  26.6 - 33.0 pg Final    MCHC 04/17/2019 31.8  31.5 - 35.7 g/dL Final    RDW 04/17/2019 13.0  12.3 - 15.4 % Final    PLATELET 38/48/0389 068  150 - 379 x10E3/uL Final    NEUTROPHILS 04/17/2019 59  Not Estab. % Final    Lymphocytes 04/17/2019 30  Not Estab. % Final    MONOCYTES 04/17/2019 9  Not Estab. % Final    EOSINOPHILS 04/17/2019 2  Not Estab. % Final    BASOPHILS 04/17/2019 0  Not Estab. % Final    ABS. NEUTROPHILS 04/17/2019 5.6  1.4 - 7.0 x10E3/uL Final    Abs Lymphocytes 04/17/2019 2.8  0.7 - 3.1 x10E3/uL Final    ABS. MONOCYTES 04/17/2019 0.8  0.1 - 0.9 x10E3/uL Final    ABS. EOSINOPHILS 04/17/2019 0.1  0.0 - 0.4 x10E3/uL Final    ABS. BASOPHILS 04/17/2019 0.0  0.0 - 0.2 x10E3/uL Final    IMMATURE GRANULOCYTES 04/17/2019 0  Not Estab. % Final    ABS. IMM. GRANS. 04/17/2019 0.0  0.0 - 0.1 x10E3/uL Final    WBC 04/17/2019 0-5  0 - 5 /hpf Final    RBC 04/17/2019 0-2  0 - 2 /hpf Final    Epithelial cells 04/17/2019 None seen  0 - 10 /hpf Final    Casts 04/17/2019 None seen  None seen /lpf Final    Mucus 04/17/2019 Present  Not Estab. Final    Bacteria 04/17/2019 None seen  None seen/Few Final    INTERPRETATION 04/17/2019 Note   Final    Supplemental report is available. Review of Systems  A comprehensive review of systems was negative except for that written in the HPI. Patient Active Problem List   Diagnosis Code    Obesity, morbid ([facility] [address] E66.01    Left leg swelling M79.89    Cellulitis of left lower leg L03.116       History reviewed. No pertinent past medical history. History reviewed. No pertinent surgical history.     Social History     Tobacco Use    Smoking status: Never Smoker    Smokeless tobacco: Never Used   Substance Use Topics    Alcohol use: Never     Frequency: Never    Drug use: Never       Family History   Problem Relation Age of Onset    Cancer Mother     Dementia Father     Alcohol abuse Brother     Cancer Maternal Aunt     Cancer Paternal Uncle     Alzheimer Paternal Grandmother        Outpatient Medications Marked as Taking for the 4/24/19 encounter (Office Visit) with Mathew Garay NP   Medication Sig Dispense Refill    trimethoprim-sulfamethoxazole (BACTRIM DS, SEPTRA DS) 160-800 mg per tablet Take 1 Tab by mouth two (2) times a day for 10 days. 20 Tab 0    hydroCHLOROthiazide (HYDRODIURIL) 12.5 mg tablet Take 1 Tab by mouth two (2) times daily as needed for Other (edema). 60 Tab 1       No current outpatient medications on file prior to visit. No current facility-administered medications on file prior to visit. No Known Allergies    PE:  Visit Vitals  /70 (BP 1 Location: Left arm, BP Patient Position: Sitting)   Pulse 96   Temp 98.3 °F (36.8 °C) (Oral)   Resp 20   Ht 6' (1.829 m)   Wt 309 lb 8 oz (140.4 kg)   SpO2 97%   BMI 41.98 kg/m²       Gen: alert, oriented, no acute distress  Head: normocephalic, atraumatic  Ears: external auditory canals clear, TMs without erythema or effusion  Eyes: pupils equal round reactive to light, sclera clear, conjunctiva clear  Oral: moist mucus membranes, no oral lesions, no pharyngeal inflammation or exudate  Neck: symmetric normal sized thyroid, no carotid bruits, no jugular vein distention  Resp: no increase work of breathing, lungs clear to ausculation bilaterally, no wheezing, rales or rhonchi  CV: S1, S2 normal.  No murmurs, rubs, or gallops. Abd: soft, not tender, not distended. No hepatosplenomegaly. Normal bowel sounds. No hernias. No abdominal or renal bruits. Neuro: cranial nerves intact, normal strength and movement in all extremities, reflexes and sensation intact and symmetric. Skin: no lesion or rash  Extremities: no cyanosis or edema    No results found for this visit on 04/24/19. Assessment/Plan:    ICD-10-CM ICD-9-CM    1. Left leg swelling M79.89 729.81 hydroCHLOROthiazide (HYDRODIURIL) 12.5 mg tablet   2. Obesity, morbid ([facility] [address] E66.01 278.01    3. Cellulitis of left lower leg L03.116 682.6    4.  Left leg cellulitis L03.116 682.6 trimethoprim-sulfamethoxazole (BACTRIM DS, SEPTRA DS) 160-800 mg per tablet     Follow-up and Dispositions    · Return if symptoms worsen or fail to improve.       lab results and schedule of future lab studies reviewed with patient  reviewed diet, exercise and weight control  reviewed medications and side effects in detail    lose weight, increase physical activity, call if any problems  Patient is moving this weekend. F/U PRN if in town. Health Maintenance reviewed - deferred. Recommended healthy diet low in carbohydrates, fats, sodium and cholesterol. Recommended regular cardiovascular exercise 3-6 times per week for 30-60 minutes daily. Chart is reviewed and updated today in the office. Records requested for other providers patient has seen and is currently seeing. Patient was offered a choice/choices in the treatment plan today. Patient expresses understanding of the plan and agrees with recommendations. Verbal and written instructions (see AVS) provided. See patient instructions for more. Patient expresses understanding of diagnosis and treatment plan.

## 2019-04-24 NOTE — PROGRESS NOTES
Discussed patient's lab results with him at his office visit today, 4/24/2019. Your lab results have been reviewed and are as follows:    Urine Test:  Your urine analysis is normal.    Cholesterol Panel: Excellent! Total Cholesterol is 158 (goal <200). Triglycerides are 80.  (goal <150)  Your HDL or \"good\" cholesterol is 40. (goal >40). Your LDL or \"bad\" cholesterol is 102. (goal <100). CMP:  Fasting blood sugar is normal at 94, your hemoglobin A1C is 5.2. You do not have diabetes. Kidney function is normal.   Your electrolytes are normal.   Your liver function is normal.     Thyroid:  Your thyroid function is normal.    Vitamin D:  Your vitamin D is low at 12.8. This is an important nutrient bone health and to fight inflammation and infection. Start OTC Vitamin D3 2,000 international units daily. A normal value is between 40-50 on average. The symptoms of vitamin D deficiency are sometimes vague and can include tiredness and general aches and pains. Some people may not have any symptoms at all. Vitamin D also fights infections, including colds and the flu, as it regulates the expression of genes that influence your immune system to attack and destroy bacteria and viruses. CBC:  Your red blood cell count is normal.   Your white blood cell count is normal.   Your platelet count is normal.   You are not anemic and your hemoglobin is 12.2. Mackenzie Clifton, MSN, MHA, FNP-BC

## 2019-04-24 NOTE — PATIENT INSTRUCTIONS
Discussed patient's lab results with him at his office visit today, 4/24/2019. Your lab results have been reviewed and are as follows:    Urine Test:  Your urine analysis is normal.    Cholesterol Panel: Excellent! Total Cholesterol is 158 (goal <200). Triglycerides are 80.  (goal <150)  Your HDL or \"good\" cholesterol is 40. (goal >40). Your LDL or \"bad\" cholesterol is 102. (goal <100). CMP:  Fasting blood sugar is normal at 94, your hemoglobin A1C is 5.2. You do not have diabetes. Kidney function is normal.   Your electrolytes are normal.   Your liver function is normal.     Thyroid:  Your thyroid function is normal.    Vitamin D:  Your vitamin D is low at 12.8. This is an important nutrient bone health and to fight inflammation and infection. Start OTC Vitamin D3 2,000 international units daily. A normal value is between 40-50 on average. The symptoms of vitamin D deficiency are sometimes vague and can include tiredness and general aches and pains. Some people may not have any symptoms at all. Vitamin D also fights infections, including colds and the flu, as it regulates the expression of genes that influence your immune system to attack and destroy bacteria and viruses. CBC:  Your red blood cell count is normal.   Your white blood cell count is normal.   Your platelet count is normal.   You are not anemic and your hemoglobin is 12.2. Mackenzie Clifton, MSN, MHA, FN-BC     Cellulitis: Care Instructions  Your Care Instructions    Cellulitis is a skin infection caused by bacteria, most often strep or staph. It often occurs after a break in the skin from a scrape, cut, bite, or puncture, or after a rash. Cellulitis may be treated without doing tests to find out what caused it. But your doctor may do tests, if needed, to look for a specific bacteria, like methicillin-resistant Staphylococcus aureus (MRSA).   The doctor has checked you carefully, but problems can develop later. If you notice any problems or new symptoms, get medical treatment right away. Follow-up care is a key part of your treatment and safety. Be sure to make and go to all appointments, and call your doctor if you are having problems. It's also a good idea to know your test results and keep a list of the medicines you take. How can you care for yourself at home? · Take your antibiotics as directed. Do not stop taking them just because you feel better. You need to take the full course of antibiotics. · Prop up the infected area on pillows to reduce pain and swelling. Try to keep the area above the level of your heart as often as you can. · If your doctor told you how to care for your wound, follow your doctor's instructions. If you did not get instructions, follow this general advice:  ? Wash the wound with clean water 2 times a day. Don't use hydrogen peroxide or alcohol, which can slow healing. ? You may cover the wound with a thin layer of petroleum jelly, such as Vaseline, and a nonstick bandage. ? Apply more petroleum jelly and replace the bandage as needed. · Be safe with medicines. Take pain medicines exactly as directed. ? If the doctor gave you a prescription medicine for pain, take it as prescribed. ? If you are not taking a prescription pain medicine, ask your doctor if you can take an over-the-counter medicine. To prevent cellulitis in the future  · Try to prevent cuts, scrapes, or other injuries to your skin. Cellulitis most often occurs where there is a break in the skin. · If you get a scrape, cut, mild burn, or bite, wash the wound with clean water as soon as you can to help avoid infection. Don't use hydrogen peroxide or alcohol, which can slow healing. · If you have swelling in your legs (edema), support stockings and good skin care may help prevent leg sores and cellulitis.   · Take care of your feet, especially if you have diabetes or other conditions that increase the risk of infection. Wear shoes and socks. Do not go barefoot. If you have athlete's foot or other skin problems on your feet, talk to your doctor about how to treat them. When should you call for help? Call your doctor now or seek immediate medical care if:    · You have signs that your infection is getting worse, such as:  ? Increased pain, swelling, warmth, or redness. ? Red streaks leading from the area. ? Pus draining from the area. ? A fever.     · You get a rash.    Watch closely for changes in your health, and be sure to contact your doctor if:    · You do not get better as expected. Where can you learn more? Go to http://neela-margaux.info/. Preston Fisher in the search box to learn more about \"Cellulitis: Care Instructions. \"  Current as of: April 17, 2018  Content Version: 11.9  © 2696-2517 [employer]. Care instructions adapted under license by [employer] (which disclaims liability or warranty for this information). If you have questions about a medical condition or this instruction, always ask your healthcare professional. [address] any warranty or liability for your use of this information.